# Patient Record
Sex: FEMALE | Race: WHITE | NOT HISPANIC OR LATINO
[De-identification: names, ages, dates, MRNs, and addresses within clinical notes are randomized per-mention and may not be internally consistent; named-entity substitution may affect disease eponyms.]

---

## 2017-06-12 ENCOUNTER — APPOINTMENT (OUTPATIENT)
Dept: ORTHOPEDIC SURGERY | Facility: CLINIC | Age: 47
End: 2017-06-12

## 2017-06-12 DIAGNOSIS — M79.9 SOFT TISSUE DISORDER, UNSPECIFIED: ICD-10-CM

## 2022-08-01 ENCOUNTER — APPOINTMENT (OUTPATIENT)
Dept: CARDIOLOGY | Facility: CLINIC | Age: 52
End: 2022-08-01

## 2022-08-01 ENCOUNTER — NON-APPOINTMENT (OUTPATIENT)
Age: 52
End: 2022-08-01

## 2022-08-01 VITALS
TEMPERATURE: 98.9 F | HEIGHT: 64 IN | BODY MASS INDEX: 41.83 KG/M2 | OXYGEN SATURATION: 97 % | SYSTOLIC BLOOD PRESSURE: 150 MMHG | DIASTOLIC BLOOD PRESSURE: 82 MMHG | WEIGHT: 245 LBS | HEART RATE: 87 BPM

## 2022-08-01 VITALS — DIASTOLIC BLOOD PRESSURE: 84 MMHG | SYSTOLIC BLOOD PRESSURE: 152 MMHG

## 2022-08-01 DIAGNOSIS — Z56.6 OTHER PHYSICAL AND MENTAL STRAIN RELATED TO WORK: ICD-10-CM

## 2022-08-01 DIAGNOSIS — Z86.39 PERSONAL HISTORY OF OTHER ENDOCRINE, NUTRITIONAL AND METABOLIC DISEASE: ICD-10-CM

## 2022-08-01 DIAGNOSIS — J30.2 OTHER SEASONAL ALLERGIC RHINITIS: ICD-10-CM

## 2022-08-01 DIAGNOSIS — Z78.9 OTHER SPECIFIED HEALTH STATUS: ICD-10-CM

## 2022-08-01 DIAGNOSIS — E04.2 NONTOXIC MULTINODULAR GOITER: ICD-10-CM

## 2022-08-01 PROCEDURE — 99203 OFFICE O/P NEW LOW 30 MIN: CPT | Mod: 25

## 2022-08-01 PROCEDURE — 93000 ELECTROCARDIOGRAM COMPLETE: CPT

## 2022-08-01 RX ORDER — FERROUS SULFATE TAB 325 MG (65 MG ELEMENTAL FE) 325 (65 FE) MG
325 (65 FE) TAB ORAL
Refills: 0 | Status: ACTIVE | COMMUNITY
Start: 2022-06-13

## 2022-08-01 SDOH — HEALTH STABILITY - MENTAL HEALTH: OTHER PHYSICAL AND MENTAL STRAIN RELATED TO WORK: Z56.6

## 2022-08-01 NOTE — HISTORY OF PRESENT ILLNESS
[FreeTextEntry1] : Patient is a 52-year-old  female who works as a  presents for cardiac evaluation because of hyperlipidemia and elevated blood pressure readings.  Patient brought her lipid panel which showed a total cholesterol over 300 and LDL around 200.  Patient brought multiple home blood pressure readings which showed average systolic 1 40-1 50 and diastolic blood pressure above 90 consistently.  Patient denies any cardiopulmonary complaints.  Patient denies orthopnea, PND or leg edema.  Patient was started on simvastatin recently by her PCP, she has not started taking the medication yet\par \par Patient is a non-smoker.  Patient has no history of for diabetes mellitus.  No history of alcohol or substance abuse.  No family history for premature coronary artery disease.

## 2022-08-01 NOTE — ASSESSMENT
[FreeTextEntry1] : EKG 8/1/2022- Sinus  Rhythm \par WITHIN NORMAL LIMITS\par \par Assessment:\par 1.  Hyperlipidemia\par 2.  Essential hypertension\par 3.  Overweight\par \par Recommendations\par Patient was educated regarding management of hyperlipidemia and hypertension.  Patient states he is walking daily and she has lost 8 pounds\par \par 1.  Start taking simvastatin 5 mg daily\par 2.  Start losartan HCT 50/12.5 mg combination 1 tablet daily\par 3.  Check basic metabolic panel in 1\par 4.  Follow-up after testing

## 2022-08-01 NOTE — PHYSICAL EXAM
[Normal] : moves all extremities, no focal deficits, normal speech [de-identified] : Chaperone: Heather Logan MA

## 2022-08-02 ENCOUNTER — TRANSCRIPTION ENCOUNTER (OUTPATIENT)
Age: 52
End: 2022-08-02

## 2022-08-12 ENCOUNTER — APPOINTMENT (OUTPATIENT)
Dept: CARDIOLOGY | Facility: CLINIC | Age: 52
End: 2022-08-12

## 2022-08-12 PROCEDURE — 93306 TTE W/DOPPLER COMPLETE: CPT

## 2022-08-24 ENCOUNTER — TRANSCRIPTION ENCOUNTER (OUTPATIENT)
Age: 52
End: 2022-08-24

## 2022-09-12 ENCOUNTER — APPOINTMENT (OUTPATIENT)
Dept: CARDIOLOGY | Facility: CLINIC | Age: 52
End: 2022-09-12

## 2022-09-12 PROCEDURE — 93015 CV STRESS TEST SUPVJ I&R: CPT

## 2022-09-20 ENCOUNTER — NON-APPOINTMENT (OUTPATIENT)
Age: 52
End: 2022-09-20

## 2022-09-26 ENCOUNTER — APPOINTMENT (OUTPATIENT)
Dept: CARDIOLOGY | Facility: CLINIC | Age: 52
End: 2022-09-26

## 2022-09-26 VITALS
SYSTOLIC BLOOD PRESSURE: 135 MMHG | TEMPERATURE: 98.1 F | DIASTOLIC BLOOD PRESSURE: 85 MMHG | OXYGEN SATURATION: 97 % | HEART RATE: 90 BPM | HEIGHT: 64 IN | WEIGHT: 242 LBS | BODY MASS INDEX: 41.32 KG/M2

## 2022-09-26 PROCEDURE — 99213 OFFICE O/P EST LOW 20 MIN: CPT

## 2022-09-26 NOTE — PHYSICAL EXAM
[Normal] : moves all extremities, no focal deficits, normal speech [de-identified] : Chaperone: Ezekiel Galan MA

## 2022-09-26 NOTE — ASSESSMENT
[FreeTextEntry1] : EKG 8/1/2022- Sinus  Rhythm \par WITHIN NORMAL LIMITS\par \par Echocardiogram August 12, 2022: \par Normal LV size and function, LVEF 55 to 60%.\par No pericardial effusion, essentially normal echo\par \par Regular stress test September 12, 2022: Negative stress test at 100% MPHR.  Patient achieved 8 METS workload\par \par Assessment:\par 1.  Hyperlipidemia\par 2.  Essential hypertension\par 3.  Overweight\par \par Recommendations\par Patient brought her home blood pressure readings which are completely within normal limits on losartan HCTZ therapy\par \par 1.  Patient strongly encouraged to start taking simvastatin 5 mg daily\par 2.  Continue losartan HCT 50/12.5 mg combination 1 tablet daily\par 3.  Check basic metabolic panel \par 4.  Follow-up in 1 year

## 2022-09-26 NOTE — HISTORY OF PRESENT ILLNESS
[FreeTextEntry1] : Patient is a 52-year-old  female who works as a  presents was seen by me in August 2022 for hyperlipidemia and hypertension.   Patient was started on simvastatin recently by her PCP, she has not started taking the medication yet\par \par Patient was started on losartan HCTZ by me, she started taking the medication, she was traveling so she did not start taking simvastatin, she plans to start taking simvastatin soon.  Patient had an echocardiogram and a stress test.  Patient is here to discuss test results.  Patient has no complaints\par \par Patient is a non-smoker.  Patient has no history of for diabetes mellitus.  No history of alcohol or substance abuse.  No family history for premature coronary artery disease.

## 2022-09-30 ENCOUNTER — TRANSCRIPTION ENCOUNTER (OUTPATIENT)
Age: 52
End: 2022-09-30

## 2023-09-06 RX ORDER — LOSARTAN POTASSIUM AND HYDROCHLOROTHIAZIDE 12.5; 5 MG/1; MG/1
50-12.5 TABLET ORAL DAILY
Qty: 90 | Refills: 0 | Status: ACTIVE | COMMUNITY
Start: 2022-08-01 | End: 1900-01-01

## 2023-10-02 ENCOUNTER — APPOINTMENT (OUTPATIENT)
Dept: CARDIOLOGY | Facility: CLINIC | Age: 53
End: 2023-10-02
Payer: COMMERCIAL

## 2023-10-02 ENCOUNTER — NON-APPOINTMENT (OUTPATIENT)
Age: 53
End: 2023-10-02

## 2023-10-02 VITALS
HEART RATE: 91 BPM | SYSTOLIC BLOOD PRESSURE: 136 MMHG | BODY MASS INDEX: 39.27 KG/M2 | DIASTOLIC BLOOD PRESSURE: 78 MMHG | TEMPERATURE: 98.4 F | HEIGHT: 64 IN | OXYGEN SATURATION: 98 % | WEIGHT: 230 LBS

## 2023-10-02 DIAGNOSIS — I10 ESSENTIAL (PRIMARY) HYPERTENSION: ICD-10-CM

## 2023-10-02 DIAGNOSIS — E78.00 PURE HYPERCHOLESTEROLEMIA, UNSPECIFIED: ICD-10-CM

## 2023-10-02 PROCEDURE — 99213 OFFICE O/P EST LOW 20 MIN: CPT | Mod: 25

## 2023-10-02 PROCEDURE — 93000 ELECTROCARDIOGRAM COMPLETE: CPT
